# Patient Record
Sex: MALE | Race: WHITE | HISPANIC OR LATINO
[De-identification: names, ages, dates, MRNs, and addresses within clinical notes are randomized per-mention and may not be internally consistent; named-entity substitution may affect disease eponyms.]

---

## 2018-03-20 PROBLEM — Z00.00 ENCOUNTER FOR PREVENTIVE HEALTH EXAMINATION: Status: ACTIVE | Noted: 2018-03-20

## 2018-06-06 ENCOUNTER — APPOINTMENT (OUTPATIENT)
Dept: GASTROENTEROLOGY | Facility: CLINIC | Age: 63
End: 2018-06-06

## 2019-01-04 ENCOUNTER — APPOINTMENT (OUTPATIENT)
Dept: INTERNAL MEDICINE | Facility: CLINIC | Age: 64
End: 2019-01-04

## 2019-05-10 ENCOUNTER — APPOINTMENT (OUTPATIENT)
Dept: NEUROLOGY | Facility: CLINIC | Age: 64
End: 2019-05-10
Payer: COMMERCIAL

## 2019-05-10 VITALS
SYSTOLIC BLOOD PRESSURE: 112 MMHG | BODY MASS INDEX: 20.59 KG/M2 | WEIGHT: 139 LBS | DIASTOLIC BLOOD PRESSURE: 50 MMHG | HEIGHT: 69 IN | HEART RATE: 81 BPM

## 2019-05-10 DIAGNOSIS — I10 ESSENTIAL (PRIMARY) HYPERTENSION: ICD-10-CM

## 2019-05-10 DIAGNOSIS — E11.9 TYPE 2 DIABETES MELLITUS W/OUT COMPLICATIONS: ICD-10-CM

## 2019-05-10 DIAGNOSIS — R41.3 OTHER AMNESIA: ICD-10-CM

## 2019-05-10 DIAGNOSIS — E78.00 PURE HYPERCHOLESTEROLEMIA, UNSPECIFIED: ICD-10-CM

## 2019-05-10 DIAGNOSIS — Z78.9 OTHER SPECIFIED HEALTH STATUS: ICD-10-CM

## 2019-05-10 DIAGNOSIS — G47.33 OBSTRUCTIVE SLEEP APNEA (ADULT) (PEDIATRIC): ICD-10-CM

## 2019-05-10 PROCEDURE — 99244 OFF/OP CNSLTJ NEW/EST MOD 40: CPT

## 2019-05-10 NOTE — DATA REVIEWED
[de-identified] : EEG 2/19/18: Frequent theta and delta slowing in the bilateral frontotemporal regions suggestive of moderate frontotemporal dysfunction.  [de-identified] : Home sleep study 3/15/18: severe TICO with an AHI of 37.6. Supine AHI 39.8. Non-supine AHI 21.4.\par Neurotrax 3/19/18: mild to moderate dementia\par Blood tests 2/6/18: vitamin B12 910, Lyme negative, RPR non-reactive

## 2019-05-10 NOTE — DISCUSSION/SUMMARY
[FreeTextEntry1] : Mr. Roque is a 64 year old man with a history of multiple medical problems including cognitive impairment.\par He was found to have severe obstructive sleep apnea on a home sleep study in 2018.\par We discussed the risks of untreated sleep apnea including sleepiness, hypertension, increased risk for heart disease and stroke, worsening seizure frequency and cognitive impairment.\par Due to the severe degree of TICO, positive airway pressure is the best treatment choice.\par I will place in order with a durable medical equipment company.  I told him that if he does not hear from the company within 2 weeks he should call our office.  Once he receives her machine she should follow up one month later to discuss any issues.\par I explained that he can use the PAP in the evening while awake and watching television or relaxation for desensitization.\par \par He has expressed a desire [email or web address]. I gave him some names of primary care physicians in the [place] area.\par \par I will follow up with him in 2-3 months, sooner if needed. \par

## 2019-05-10 NOTE — HISTORY OF PRESENT ILLNESS
[FreeTextEntry1] : Mr. Roque is a 64 year old man who I previously saw In April 2018 at my previous practice.\par At that time he reported sleeping from about 2 AM unti l8-9 AM and feeling tired upon awakening. He does not take naps during the day.\par he was drinking about three cups of coffee per day.\par His wife reported that eh snores. He does not wake up feeling like he is choking or gasping for air. He usually sleeps on his side. \par He denied any symptoms of Restless Legs Syndrome or any history of kicking his legs during sleep.\par His Cygnet Sleepiness Scale Score was 4/24 at that time. \par At the time of the visit I recommended treatment with PAP and suggested that an auto PAP machine be ordered. He asked me to hold off at the time as he wanted to see a sleep specialist within the [facility] as he believed it would be more affordable with his insurance. He was given a referral with a copy of his sleep study. \par \par He continues to have snoring and feels tired the next day. \par He states that he sleeps between 6-7 hours per night. \par His wife sleeps in a different room.\par He has not yet gotten a CPAP machine.\par He is willing to give APAP a try at this time. \par He has started dialysis since I last saw him.

## 2019-05-10 NOTE — PHYSICAL EXAM
[FreeTextEntry1] : Examination:\par Constitutional: normal, no apparent distress\par Eyes: normal conjunctiva b/l, no ptosis, visual fields full\par Respiratory: no respiratory distress, normal effort, normal auscultation\par Cardiovascular: normal rate, rhythm, no murmurs\par Neck: supple, no masses\par Vascular: carotids normal\par Skin: normal color, no rashes\par Psych: normal mood, affect\par \par Neurological:\par Memory: short term memory impairment\par Language intact/no aphasia\par Cranial Nerves: II-XII intact, Pupils equally round and reactive to light, ocular muscles/movements intact, no ptosis, no facial weakness, tongue protrudes normally in the midline, \par Motor: normal tone, no pronator drift, full strength in all four extremities in the proximal and distal muscle groups\par Coordination: mild, high frequency tremor in bilateral hands,  Fine motor movements intact, rapid alternating movements intact, finger to nose intact bilaterally\par Sensory: intact to light touch, vibration, joint position sense, negative Romberg examination\par DTRs: symmetric, 2+ in b/l triceps, 2+ in b/l biceps, 2+ in b/l brachioradialis, 1+ in bilateral patellars, trace in bilateral Achilles, Babinskis negative bilaterally\par Gait: narrow based, steady\par \par

## 2019-05-10 NOTE — REVIEW OF SYSTEMS
[Feeling Tired] : feeling tired [Incontinence] : incontinence [Negative] : Musculoskeletal [FreeTextEntry8] : sexual dysfunction [FreeTextEntry4] : snoring

## 2019-07-15 ENCOUNTER — APPOINTMENT (OUTPATIENT)
Dept: CARDIOLOGY | Facility: CLINIC | Age: 64
End: 2019-07-15

## 2019-07-15 VITALS
DIASTOLIC BLOOD PRESSURE: 62 MMHG | SYSTOLIC BLOOD PRESSURE: 101 MMHG | BODY MASS INDEX: 20.23 KG/M2 | HEART RATE: 72 BPM | OXYGEN SATURATION: 96 % | WEIGHT: 137 LBS

## 2019-07-15 DIAGNOSIS — N18.9 CHRONIC KIDNEY DISEASE, UNSPECIFIED: ICD-10-CM

## 2019-07-15 DIAGNOSIS — I77.0 ARTERIOVENOUS FISTULA, ACQUIRED: ICD-10-CM

## 2019-07-15 RX ORDER — AZATHIOPRINE 50 1/1
50 TABLET ORAL
Refills: 0 | Status: ACTIVE | COMMUNITY

## 2019-07-15 RX ORDER — DILTIAZEM HCL 360 MG
360 CAPSULE, EXT RELEASE 24 HR ORAL
Refills: 0 | Status: ACTIVE | COMMUNITY

## 2019-07-15 RX ORDER — ADHESIVE TAPE 3"X 2.3 YD
50 MCG TAPE, NON-MEDICATED TOPICAL
Refills: 0 | Status: ACTIVE | COMMUNITY

## 2019-07-15 RX ORDER — SEVELAMER HYDROCHLORIDE 800 MG/1
800 TABLET, FILM COATED ORAL
Refills: 0 | Status: ACTIVE | COMMUNITY

## 2019-07-15 RX ORDER — TACROLIMUS 1 MG/1
1 CAPSULE ORAL
Refills: 0 | Status: ACTIVE | COMMUNITY

## 2019-07-15 RX ORDER — IRON/IRON ASP GLY/FA/MV-MIN 38 125-25-1MG
TABLET ORAL
Refills: 0 | Status: ACTIVE | COMMUNITY

## 2019-07-15 RX ORDER — ATORVASTATIN CALCIUM 20 MG/1
20 TABLET, FILM COATED ORAL
Qty: 1 | Refills: 3 | Status: ACTIVE | COMMUNITY

## 2019-07-15 RX ORDER — FOLIC ACID/VIT B COMPLEX AND C 0.8 MG
TABLET ORAL
Refills: 0 | Status: ACTIVE | COMMUNITY

## 2019-07-15 RX ORDER — TORSEMIDE 20 MG/1
20 TABLET ORAL
Refills: 0 | Status: ACTIVE | COMMUNITY

## 2019-07-15 RX ORDER — PSYLLIUM HUSK 0.4 G
CAPSULE ORAL
Refills: 0 | Status: ACTIVE | COMMUNITY

## 2019-08-13 ENCOUNTER — APPOINTMENT (OUTPATIENT)
Dept: NEUROLOGY | Facility: CLINIC | Age: 64
End: 2019-08-13

## 2021-01-19 ENCOUNTER — EMERGENCY (EMERGENCY)
Facility: HOSPITAL | Age: 66
LOS: 1 days | Discharge: HOME | End: 2021-01-19
Attending: EMERGENCY MEDICINE
Payer: SELF-PAY

## 2021-01-19 VITALS
RESPIRATION RATE: 18 BRPM | SYSTOLIC BLOOD PRESSURE: 117 MMHG | TEMPERATURE: 97 F | OXYGEN SATURATION: 100 % | DIASTOLIC BLOOD PRESSURE: 68 MMHG | HEART RATE: 71 BPM

## 2021-01-19 VITALS
TEMPERATURE: 98 F | SYSTOLIC BLOOD PRESSURE: 123 MMHG | RESPIRATION RATE: 20 BRPM | DIASTOLIC BLOOD PRESSURE: 79 MMHG | OXYGEN SATURATION: 95 % | HEART RATE: 62 BPM

## 2021-01-19 DIAGNOSIS — Z94.1 HEART TRANSPLANT STATUS: Chronic | ICD-10-CM

## 2021-01-19 LAB
ALBUMIN SERPL ELPH-MCNC: 4.5 G/DL — SIGNIFICANT CHANGE UP (ref 3.3–5.2)
ALP SERPL-CCNC: 51 U/L — SIGNIFICANT CHANGE UP (ref 40–120)
ALT FLD-CCNC: 13 U/L — SIGNIFICANT CHANGE UP
ANION GAP SERPL CALC-SCNC: 14 MMOL/L — SIGNIFICANT CHANGE UP (ref 5–17)
ANISOCYTOSIS BLD QL: SIGNIFICANT CHANGE UP
APTT BLD: 31.6 SEC — SIGNIFICANT CHANGE UP (ref 27.5–35.5)
AST SERPL-CCNC: 19 U/L — SIGNIFICANT CHANGE UP
BASOPHILS # BLD AUTO: 0.05 K/UL — SIGNIFICANT CHANGE UP (ref 0–0.2)
BASOPHILS NFR BLD AUTO: 0.9 % — SIGNIFICANT CHANGE UP (ref 0–2)
BILIRUB SERPL-MCNC: 0.5 MG/DL — SIGNIFICANT CHANGE UP (ref 0.4–2)
BUN SERPL-MCNC: 43 MG/DL — HIGH (ref 8–20)
CALCIUM SERPL-MCNC: 9.9 MG/DL — SIGNIFICANT CHANGE UP (ref 8.6–10.2)
CHLORIDE SERPL-SCNC: 89 MMOL/L — LOW (ref 98–107)
CO2 SERPL-SCNC: 29 MMOL/L — SIGNIFICANT CHANGE UP (ref 22–29)
CREAT SERPL-MCNC: 4.54 MG/DL — HIGH (ref 0.5–1.3)
EOSINOPHIL # BLD AUTO: 0 K/UL — SIGNIFICANT CHANGE UP (ref 0–0.5)
EOSINOPHIL NFR BLD AUTO: 0 % — SIGNIFICANT CHANGE UP (ref 0–6)
GIANT PLATELETS BLD QL SMEAR: PRESENT — SIGNIFICANT CHANGE UP
GLUCOSE SERPL-MCNC: 138 MG/DL — HIGH (ref 70–99)
HCT VFR BLD CALC: 33.7 % — LOW (ref 39–50)
HGB BLD-MCNC: 11.5 G/DL — LOW (ref 13–17)
INR BLD: 1.05 RATIO — SIGNIFICANT CHANGE UP (ref 0.88–1.16)
LYMPHOCYTES # BLD AUTO: 0.39 K/UL — LOW (ref 1–3.3)
LYMPHOCYTES # BLD AUTO: 6.9 % — LOW (ref 13–44)
MACROCYTES BLD QL: SIGNIFICANT CHANGE UP
MAGNESIUM SERPL-MCNC: 2.2 MG/DL — SIGNIFICANT CHANGE UP (ref 1.8–2.6)
MANUAL SMEAR VERIFICATION: SIGNIFICANT CHANGE UP
MCHC RBC-ENTMCNC: 34.1 GM/DL — SIGNIFICANT CHANGE UP (ref 32–36)
MCHC RBC-ENTMCNC: 36.2 PG — HIGH (ref 27–34)
MCV RBC AUTO: 106 FL — HIGH (ref 80–100)
MONOCYTES # BLD AUTO: 0.49 K/UL — SIGNIFICANT CHANGE UP (ref 0–0.9)
MONOCYTES NFR BLD AUTO: 8.7 % — SIGNIFICANT CHANGE UP (ref 2–14)
NEUTROPHILS # BLD AUTO: 4.68 K/UL — SIGNIFICANT CHANGE UP (ref 1.8–7.4)
NEUTROPHILS NFR BLD AUTO: 83.5 % — HIGH (ref 43–77)
PLAT MORPH BLD: NORMAL — SIGNIFICANT CHANGE UP
PLATELET # BLD AUTO: 205 K/UL — SIGNIFICANT CHANGE UP (ref 150–400)
POLYCHROMASIA BLD QL SMEAR: SLIGHT — SIGNIFICANT CHANGE UP
POTASSIUM SERPL-MCNC: 4 MMOL/L — SIGNIFICANT CHANGE UP (ref 3.5–5.3)
POTASSIUM SERPL-SCNC: 4 MMOL/L — SIGNIFICANT CHANGE UP (ref 3.5–5.3)
PROT SERPL-MCNC: 7.8 G/DL — SIGNIFICANT CHANGE UP (ref 6.6–8.7)
PROTHROM AB SERPL-ACNC: 12.1 SEC — SIGNIFICANT CHANGE UP (ref 10.6–13.6)
RBC # BLD: 3.18 M/UL — LOW (ref 4.2–5.8)
RBC # FLD: 12.8 % — SIGNIFICANT CHANGE UP (ref 10.3–14.5)
RBC BLD AUTO: ABNORMAL
SODIUM SERPL-SCNC: 132 MMOL/L — LOW (ref 135–145)
TROPONIN T SERPL-MCNC: 0.02 NG/ML — SIGNIFICANT CHANGE UP (ref 0–0.06)
WBC # BLD: 5.61 K/UL — SIGNIFICANT CHANGE UP (ref 3.8–10.5)
WBC # FLD AUTO: 5.61 K/UL — SIGNIFICANT CHANGE UP (ref 3.8–10.5)

## 2021-01-19 PROCEDURE — 70450 CT HEAD/BRAIN W/O DYE: CPT | Mod: 26

## 2021-01-19 PROCEDURE — 99285 EMERGENCY DEPT VISIT HI MDM: CPT

## 2021-01-19 PROCEDURE — 93010 ELECTROCARDIOGRAM REPORT: CPT

## 2021-01-19 RX ORDER — ACETAMINOPHEN 500 MG
975 TABLET ORAL ONCE
Refills: 0 | Status: COMPLETED | OUTPATIENT
Start: 2021-01-19 | End: 2021-01-19

## 2021-01-19 RX ORDER — SODIUM CHLORIDE 9 MG/ML
250 INJECTION INTRAMUSCULAR; INTRAVENOUS; SUBCUTANEOUS ONCE
Refills: 0 | Status: COMPLETED | OUTPATIENT
Start: 2021-01-19 | End: 2021-01-19

## 2021-01-19 RX ADMIN — SODIUM CHLORIDE 250 MILLILITER(S): 9 INJECTION INTRAMUSCULAR; INTRAVENOUS; SUBCUTANEOUS at 15:09

## 2021-01-19 RX ADMIN — Medication 975 MILLIGRAM(S): at 15:32

## 2021-01-19 NOTE — ED PROVIDER NOTE - CLINICAL SUMMARY MEDICAL DECISION MAKING FREE TEXT BOX
Episode is consistent with orthostatic syncope after dialysis. He states he now feels much better, he is at baseline, neuro intact. Stable for dc and states he will f/u with his cardio

## 2021-01-19 NOTE — ED ADULT TRIAGE NOTE - CHIEF COMPLAINT QUOTE
syncopal episode occurred around 1315 states fell and hit head. denies blood thinners.  pt finished dialysis treatment and had syncopal episode. hx heart transplant not taking antirejection medication. pallor in appearance, generalized malaise. complaint of dizziness, md ritetr called to bedside for evaluation

## 2021-01-19 NOTE — CONSULT NOTE ADULT - SUBJECTIVE AND OBJECTIVE BOX
Patient is a 65y old  Male who presents with a chief complaint of         · Chief Complaint: The patient is a 65y Male complaining of syncope.  · HPI Objective Statement: 65 year old male with PMH ESRD on HD, heart transplant presents with syncope. Pt finished his HD today, he stood up form his chair quickly, felt lightheaded, and fell to the floor. he states he thinks he passed out prior to hitting his head. He is c/o diffuse throbbing headache. He denies chest pain, palpitations, SOB, fever, chills, abd pain, numbness, tingling, weakness.    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 , on [employer]graf  HTN     No pertinent past medical history    No significant past surgical history    AVF   Heart Tx         FAMILY HISTORY:      Social History:  No Smok or ETOH   MEDICATIONS  (STANDING):    MEDICATIONS  (PRN):      Allergies    No Known Allergies    Intolerances      Vital Signs Last 24 Hrs  T(C): 36.1 (19 Jan 2021 19:30), Max: 36.7 (19 Jan 2021 13:47)  T(F): 97 (19 Jan 2021 19:30), Max: 98 (19 Jan 2021 13:47)  HR: 71 (19 Jan 2021 19:30) (62 - 71)  BP: 117/68 (19 Jan 2021 19:30) (117/68 - 123/79)  BP(mean): --  RR: 18 (19 Jan 2021 19:30) (18 - 20)  SpO2: 100% (19 Jan 2021 19:30) (95% - 100%)  Daily     Daily   I&O's Detail    I&O's Summary      PHYSICAL EXAM:    GENERAL: NAD, well-groomed, well-developed  HEAD:  Atraumatic, Normocephalic  EYES: EOMI, PERRLA, conjunctiva and sclera clear  ENMT: No tonsillar erythema, exudates, or enlargement; Moist mucous membranes, Good dentition, No lesions  NECK: Supple, No JVD, no tenderness , FROM   NERVOUS SYSTEM:  Alert & Oriented X3, Good concentration; Motor Strength 5/5 B/L upper and lower extremities; DTRs 2+ intact and symmetric  CHEST/LUNG: Clear to percussion bilaterally; No rales, rhonchi, wheezing, or rubs  HEART: Regular rate and rhythm; No murmurs, rubs, or gallops  ABDOMEN: Soft, Nontender, Nondistended; Bowel sounds present  EXTREMITIES:  2+ Peripheral Pulses, No edema , LUE AVF with good thrill       LABS:                        11.5   5.61  )-----------( 205      ( 19 Jan 2021 14:53 )             33.7     01-19    132<L>  |  89<L>  |  43.0<H>  ----------------------------<  138<H>  4.0   |  29.0  |  4.54<H>    Ca    9.9      19 Jan 2021 14:53  Mg     2.2     01-19    TPro  7.8  /  Alb  4.5  /  TBili  0.5  /  DBili  x   /  AST  19  /  ALT  13  /  AlkPhos  51  01-19    PT/INR - ( 19 Jan 2021 14:53 )   PT: 12.1 sec;   INR: 1.05 ratio         PTT - ( 19 Jan 2021 14:53 )  PTT:31.6 sec    Magnesium, Serum: 2.2 mg/dL (01-19 @ 14:53)    < from: CT Head No Cont (01.19.21 @ 14:06) >     EXAM:  CT BRAIN                          PROCEDURE DATE:  01/19/2021          INTERPRETATION:  INDICATION:  Status post trauma with head injury.   Headache.  TECHNIQUE:  A non contrast 2.5mm axial CT study of the brain was performed from skull base to vertex. Coronal and sagittal reformations were generated from the axial data.  COMPARISON EXAMINATION:  no prior.    FINDINGS:    HEMISPHERES:There is mild volume loss and involutional change. Also there are scattered areas of white matter hypodensity suggesting early/mild small vessel ischemic change. No acute territorial infarct or hemorrhagic lesion noted.  VENTRICLES:  Midline and normal in size.  POSTERIOR FOSSA:  The brain stem and cerebellum are unremarkable.  No CP angle lesion noted.  EXTRACEREBRAL SPACES:  No subdural or epidural collections are noted.  SKULL BASE AND CALVARIUM:  Appears intact.  No fracture or destructive lesion is identified.  SINUSES AND MASTOIDS:  Clear.  MISCELLANEOUS:  No orbital or suprasellar abnormalitynoted.    IMPRESSION:    1)  mild volume loss and involutional change, with no acute abnormality suggested..  2)  no intracerebral hemorrhage, contusion, or extracerebral collections are identified.                ALL KAUFMAN MD; Attending Radiologist  This document has been electronically signed. Jan 19 2021  2:13PM    < end of copied text >        RADIOLOGY & ADDITIONAL TESTS:

## 2021-01-19 NOTE — ED ADULT NURSE NOTE - CHIEF COMPLAINT QUOTE
syncopal episode occurred around 1315 states fell and hit head. denies blood thinners.  pt finished dialysis treatment and had syncopal episode. hx heart transplant not taking antirejection medication. pallor in appearance, generalized malaise. complaint of dizziness, md ritter called to bedside for evaluation

## 2021-01-19 NOTE — ED PROVIDER NOTE - PATIENT PORTAL LINK FT
You can access the FollowMyHealth Patient Portal offered by [facility] by registering at the following website: [email or web address]. By joining [employer]’s FollowMyHealth portal, you will also be able to view your health information using other applications (apps) compatible with our system.

## 2021-01-19 NOTE — ED PROVIDER NOTE - OBJECTIVE STATEMENT
65 year old male with PMH ESRD on HD, heart transplant presents with syncope. Pt finished his HD today, he stood up form his chair quickly, felt lightheaded, and fell to the floor. he states he thinks he passed out prior to hitting his head. He is c/o diffuse throbbing headache. He denies chest pain, palpitations, SOB, fever, chills, abd pain, numbness, tingling, weakness.

## 2021-01-19 NOTE — CONSULT NOTE ADULT - ASSESSMENT
ESRD - Episode of Syncope post HD   Likely related to fluid removal orthostasis   CT head no acute changes   Hgb stable   BP stable in ER   No focal abnormalities on exam except  bump to occiput

## 2021-01-19 NOTE — ED ADULT NURSE REASSESSMENT NOTE - NS ED NURSE REASSESS COMMENT FT1
Assumed care for patient at 1515. patient is resting in stretcher on tele box, IVF infusing through peripheral IV.  C/o headache, notified Dr. Wells. 975mg tylenol to be given as ordered.  Ekg obtained.  Plan of care explained. Patient expressed understanding. Assumed care for patient at 1515.  Report received from cc RN Helen Maharaj, charting as noted.  patient is resting in stretcher on tele box, IVF infusing through peripheral IV.  C/o headache, notified Dr. Wells. 975mg tylenol to be given as ordered.  Ekg obtained.  Plan of care explained. Patient expressed understanding.

## 2022-02-04 ENCOUNTER — EMERGENCY (EMERGENCY)
Facility: HOSPITAL | Age: 67
LOS: 1 days | Discharge: HOME | End: 2022-02-04
Attending: STUDENT IN AN ORGANIZED HEALTH CARE EDUCATION/TRAINING PROGRAM
Payer: MEDICARE

## 2022-02-04 VITALS
OXYGEN SATURATION: 98 % | HEART RATE: 77 BPM | DIASTOLIC BLOOD PRESSURE: 75 MMHG | HEIGHT: 69 IN | TEMPERATURE: 98 F | RESPIRATION RATE: 16 BRPM | SYSTOLIC BLOOD PRESSURE: 128 MMHG

## 2022-02-04 DIAGNOSIS — Z94.1 HEART TRANSPLANT STATUS: Chronic | ICD-10-CM

## 2022-02-04 LAB
ALBUMIN SERPL ELPH-MCNC: 4.5 G/DL — SIGNIFICANT CHANGE UP (ref 3.3–5.2)
ALP SERPL-CCNC: 79 U/L — SIGNIFICANT CHANGE UP (ref 40–120)
ALT FLD-CCNC: 9 U/L — SIGNIFICANT CHANGE UP
ANION GAP SERPL CALC-SCNC: 15 MMOL/L — SIGNIFICANT CHANGE UP (ref 5–17)
AST SERPL-CCNC: 17 U/L — SIGNIFICANT CHANGE UP
BASOPHILS # BLD AUTO: 0.04 K/UL — SIGNIFICANT CHANGE UP (ref 0–0.2)
BASOPHILS NFR BLD AUTO: 0.9 % — SIGNIFICANT CHANGE UP (ref 0–2)
BILIRUB SERPL-MCNC: 0.5 MG/DL — SIGNIFICANT CHANGE UP (ref 0.4–2)
BUN SERPL-MCNC: 46.1 MG/DL — HIGH (ref 8–20)
CALCIUM SERPL-MCNC: 9.5 MG/DL — SIGNIFICANT CHANGE UP (ref 8.6–10.2)
CHLORIDE SERPL-SCNC: 88 MMOL/L — LOW (ref 98–107)
CO2 SERPL-SCNC: 29 MMOL/L — SIGNIFICANT CHANGE UP (ref 22–29)
CREAT SERPL-MCNC: 6.04 MG/DL — HIGH (ref 0.5–1.3)
EOSINOPHIL # BLD AUTO: 0.01 K/UL — SIGNIFICANT CHANGE UP (ref 0–0.5)
EOSINOPHIL NFR BLD AUTO: 0.2 % — SIGNIFICANT CHANGE UP (ref 0–6)
GLUCOSE SERPL-MCNC: 160 MG/DL — HIGH (ref 70–99)
HCT VFR BLD CALC: 36 % — LOW (ref 39–50)
HGB BLD-MCNC: 11.8 G/DL — LOW (ref 13–17)
IMM GRANULOCYTES NFR BLD AUTO: 0 % — SIGNIFICANT CHANGE UP (ref 0–1.5)
LYMPHOCYTES # BLD AUTO: 0.6 K/UL — LOW (ref 1–3.3)
LYMPHOCYTES # BLD AUTO: 13.9 % — SIGNIFICANT CHANGE UP (ref 13–44)
MAGNESIUM SERPL-MCNC: 2.3 MG/DL — SIGNIFICANT CHANGE UP (ref 1.8–2.6)
MCHC RBC-ENTMCNC: 32.8 GM/DL — SIGNIFICANT CHANGE UP (ref 32–36)
MCHC RBC-ENTMCNC: 33.8 PG — SIGNIFICANT CHANGE UP (ref 27–34)
MCV RBC AUTO: 103.2 FL — HIGH (ref 80–100)
MONOCYTES # BLD AUTO: 0.47 K/UL — SIGNIFICANT CHANGE UP (ref 0–0.9)
MONOCYTES NFR BLD AUTO: 10.9 % — SIGNIFICANT CHANGE UP (ref 2–14)
NEUTROPHILS # BLD AUTO: 3.19 K/UL — SIGNIFICANT CHANGE UP (ref 1.8–7.4)
NEUTROPHILS NFR BLD AUTO: 74.1 % — SIGNIFICANT CHANGE UP (ref 43–77)
PHOSPHATE SERPL-MCNC: 3.7 MG/DL — SIGNIFICANT CHANGE UP (ref 2.4–4.7)
PLATELET # BLD AUTO: 125 K/UL — LOW (ref 150–400)
POTASSIUM SERPL-MCNC: 4.3 MMOL/L — SIGNIFICANT CHANGE UP (ref 3.5–5.3)
POTASSIUM SERPL-SCNC: 4.3 MMOL/L — SIGNIFICANT CHANGE UP (ref 3.5–5.3)
PROT SERPL-MCNC: 7.3 G/DL — SIGNIFICANT CHANGE UP (ref 6.6–8.7)
RBC # BLD: 3.49 M/UL — LOW (ref 4.2–5.8)
RBC # FLD: 13.8 % — SIGNIFICANT CHANGE UP (ref 10.3–14.5)
SODIUM SERPL-SCNC: 132 MMOL/L — LOW (ref 135–145)
TROPONIN T SERPL-MCNC: 0.04 NG/ML — SIGNIFICANT CHANGE UP (ref 0–0.06)
WBC # BLD: 4.31 K/UL — SIGNIFICANT CHANGE UP (ref 3.8–10.5)
WBC # FLD AUTO: 4.31 K/UL — SIGNIFICANT CHANGE UP (ref 3.8–10.5)

## 2022-02-04 PROCEDURE — 80053 COMPREHEN METABOLIC PANEL: CPT

## 2022-02-04 PROCEDURE — 36415 COLL VENOUS BLD VENIPUNCTURE: CPT

## 2022-02-04 PROCEDURE — 83735 ASSAY OF MAGNESIUM: CPT

## 2022-02-04 PROCEDURE — G1004: CPT

## 2022-02-04 PROCEDURE — 84484 ASSAY OF TROPONIN QUANT: CPT

## 2022-02-04 PROCEDURE — 70450 CT HEAD/BRAIN W/O DYE: CPT | Mod: 26,ME

## 2022-02-04 PROCEDURE — 93010 ELECTROCARDIOGRAM REPORT: CPT

## 2022-02-04 PROCEDURE — 93005 ELECTROCARDIOGRAM TRACING: CPT

## 2022-02-04 PROCEDURE — 70450 CT HEAD/BRAIN W/O DYE: CPT | Mod: ME

## 2022-02-04 PROCEDURE — 71045 X-RAY EXAM CHEST 1 VIEW: CPT

## 2022-02-04 PROCEDURE — 99285 EMERGENCY DEPT VISIT HI MDM: CPT | Mod: 25

## 2022-02-04 PROCEDURE — 85025 COMPLETE CBC W/AUTO DIFF WBC: CPT

## 2022-02-04 PROCEDURE — 99285 EMERGENCY DEPT VISIT HI MDM: CPT

## 2022-02-04 PROCEDURE — 71045 X-RAY EXAM CHEST 1 VIEW: CPT | Mod: 26

## 2022-02-04 PROCEDURE — 84100 ASSAY OF PHOSPHORUS: CPT

## 2022-02-04 RX ORDER — ACETAMINOPHEN 500 MG
975 TABLET ORAL ONCE
Refills: 0 | Status: COMPLETED | OUTPATIENT
Start: 2022-02-04 | End: 2022-02-04

## 2022-02-04 RX ADMIN — Medication 975 MILLIGRAM(S): at 15:27

## 2022-02-04 NOTE — CONSULT NOTE ADULT - SUBJECTIVE AND OBJECTIVE BOX
Patient is a 67y old  Male who presents with a chief complaint of  AMS.    HPI:   Pt sent in after  dialysis today. Pt  had AMS without fever or cough. Pt states he has been having episodes of confusion at home at times. Pt can"t be more specific. Hx of DM 2, Hypertension, CHF. Pt is a heart transplant pt.    PAST MEDICAL & SURGICAL HISTORY:  Heart disease  heart transplant 2007    H/O heart transplant        FAMILY HISTORY:  [place]    Social History: Prior smoker    MEDICATIONS  (STANDING):    MEDICATIONS  (PRN):   Meds reviewed    Allergies    No Known Allergies        REVIEW OF SYSTEMS:    As above    ALLERY AND IMMUNOLOGIC: No hives or eczema      Vital Signs Last 24 Hrs  T(C): 36.4 (04 Feb 2022 11:30), Max: 36.4 (04 Feb 2022 11:30)  T(F): 97.5 (04 Feb 2022 11:30), Max: 97.5 (04 Feb 2022 11:30)  HR: 77 (04 Feb 2022 11:30) (77 - 77)  BP: 128/75 (04 Feb 2022 11:30) (128/75 - 128/75)  BP(mean): --  RR: 16 (04 Feb 2022 11:30) (16 - 16)  SpO2: 98% (04 Feb 2022 11:30) (98% - 98%)  Daily Height in cm: 175.26 (04 Feb 2022 11:30)    Daily     PHYSICAL EXAM:    GENERAL: appears chronically ill, oriented.  HEAD:  Atraumatic, Normocephalic  EYES: EOMI, PERRLA, conjunctiva and sclera clear  ENMT: No tonsillar erythema, exudates, or enlargement; Moist mucous membranes, Good dentition, No lesions  NECK: Supple, neck  veins flat  NERVOUS SYSTEM:  Alert & Oriented X3, slow mentation with decreased memory; Motor Strength wnl upper and lower extremities;  CHEST/LUNG: Clear to percussion bilaterally; No rales, rhonchi, wheezing, or rubs  HEART: Regular rate and rhythm; Sternal scar, no rub  ABDOMEN: Soft, Nontender, Nondistended; Bowel sounds present  EXTREMITIES:  Ankle and lower leg  edema  LYMPH: No lymphadenopathy noted  SKIN: No rashes or lesions, pale  : Neg    LABS:                        11.8   4.31  )-----------( 125      ( 04 Feb 2022 13:38 )             36.0     02-04    132<L>  |  88<L>  |  46.1<H>  ----------------------------<  160<H>  4.3   |  29.0  |  6.04<H>    Ca    9.5      04 Feb 2022 13:38  Phos  3.7     02-04  Mg     2.3     02-04    TPro  7.3  /  Alb  4.5  /  TBili  0.5  /  DBili  x   /  AST  17  /  ALT  9   /  AlkPhos  79  02-04        Magnesium, Serum: 2.3 mg/dL (02-04 @ 13:38)  Phosphorus Level, Serum: 3.7 mg/dL (02-04 @ 13:38)          RADIOLOGY & ADDITIONAL TESTS:

## 2022-02-04 NOTE — ED ADULT NURSE REASSESSMENT NOTE - NS ED NURSE REASSESS COMMENT FT1
Pt AOx4, [place] speaking. Pt in no acute distress, resp even and unlabored, lung sounds clear, skin warm and dry to touch, abdomen soft and non-tender. Pt denies having pain at this time. Labs drawn and sent to lab. Pt sitting up in bed, will continue to monitor.

## 2022-02-04 NOTE — ED PROVIDER NOTE - NSFOLLOWUPINSTRUCTIONS_ED_ALL_ED_FT
1. Follow up with your doctor within 2-3 days.   2. Continue your dialysis as scheduled.  3. Return to the ED for any new or worsening symptoms.     Dizziness    Dizziness can manifest as a feeling of unsteadiness or light-headedness. You may feel like you are about to faint. This condition can be caused by a number of things, including medicines, dehydration, or illness. Drink enough fluid to keep your urine clear or pale yellow. Do not drink alcohol and limit your caffeine intake. Avoid quick or sudden movements.  Rise slowly from chairs and steady yourself until you feel okay. In the morning, first sit up on the side of the bed.    SEEK IMMEDIATE MEDICAL CARE IF YOU HAVE ANY OF THE FOLLOWING SYMPTOMS: vomiting, changes in your vision or speech, weakness in your arms or legs, trouble speaking or swallowing, chest pain, abdominal pain, shortness of breath, sweating, bleeding, headache, neck pain, or fever.

## 2022-02-04 NOTE — ED ADULT TRIAGE NOTE - CHIEF COMPLAINT QUOTE
Pt reports was receiving dialysis when he suddenly experienced dizziness, nausea and 8/10 frontal lobe HA. EMS reports staff at dialysis facility state pt's port "infiltrated" when he was 30 minutes from completion. Pt was given dose of clonidine PTA for hypertension. MD called for STAT eval.

## 2022-02-04 NOTE — CONSULT NOTE ADULT - ASSESSMENT
A) Heart transplant pt with CRF 5 and mentation issue noted by pt and  staff.    P) Labs, Head CAT no dye.

## 2022-02-04 NOTE — ED PROVIDER NOTE - CLINICAL SUMMARY MEDICAL DECISION MAKING FREE TEXT BOX
68yo M w/[facility] heart transplant 2007, ESRD on HD T/R/S presents for dizziness, HA. EKG, labs, CT head pending.

## 2022-02-04 NOTE — ED PROVIDER NOTE - NS ED ROS FT
Constitutional: no fever, no sweats, and no chills.  CV: no chest pain, no edema.  Resp: no cough, no dyspnea  GI: no abdominal pain, no nausea and no vomiting.  MSK: no msk pain, no weakness  Skin: no lesions, and no rashes.  Neuro: no LOC, +headache, no sensory deficits, and +dizziness  ROS otherwise negative except as noted in HPI.

## 2022-02-04 NOTE — ED PROVIDER NOTE - PATIENT PORTAL LINK FT
You can access the FollowMyHealth Patient Portal offered by [facility] by registering at the following website: [email or web address]. By joining [employer]’s FollowMyHealth portal, you will also be able to view your health information using other applications (apps) compatible with our system.

## 2022-02-04 NOTE — ED PROVIDER NOTE - PROGRESS NOTE DETAILS
Resident Olivia Shaffer: spoke to son, All Rqoue, English-speaking. Discussed plan for labs, CT. Answered all questions. Resident Olivia Shaffer: spoke to reva Carrizales again, discussed lab results. CT head pending. Resident Olivia Shaffer: nephrology has seen the pt, recs labs and CT head. Labs unremarkable other than cre 6, pt already on dialysis. CT pending. Resident Olivia Shaffer: spoke to heart transplant team, they report pt had stress test and echo, CT head all yesterday at the office, and everything was normal. I explained the pt's complaints and current work-up to them, they have no additional recommendations. Resident Olivia Shaffer: spoke to radiology, they report CT head is negative.

## 2022-02-04 NOTE — ED PROVIDER NOTE - ATTENDING CONTRIBUTION TO CARE
I performed a face to face history and physical exam of the patient and discussed their management with the student/resident/ACP. I reviewed the student/resident/ACP's note and agree with the documented findings and plan of care.    Pt was at dialysis today when he had onset of dizziness and frontal headache. They stopped dialysis 30 min early and sent him to the ED. Pt only co frontal headache currently. Dr. Shaffer spoke with his heart transplant coordinator who stated patient had a stress and echo yesterday and a CT head.  CT head was because they have noticed some recent cognitive decline. no cp. no sob.    physical - rrr ctab. abd - soft, nt. no edema. no rash. neuro - [id number] 5/5 x4. sensation intact x4. CN II-XII intact.    plan - labs and imaging reviewed.  Pt feeling better.  likely had too much fluid removed during dialysis. Pt reassured and instructed to f/up with his doctors and to return for any new/concerning symptoms.

## 2022-02-04 NOTE — ED PROVIDER NOTE - OBJECTIVE STATEMENT
66yo M w/pmh heart transplant 2007, ESRD on HD T/R/S presents for dizziness, HA. Per EMS, pt was at dialysis when symptom onset, dialysis stopped 30min early. Per pt, dizzy since this morning, does not know why he was scheduled for dialysis today (a Friday), does not know when he last got dialysis. Per chart review, nephrologist is Dr. Chapa.

## 2024-06-10 PROBLEM — Z78.9 OTHER SPECIFIED HEALTH STATUS: Chronic | Status: ACTIVE | Noted: 2021-01-19

## 2024-09-06 ENCOUNTER — APPOINTMENT (OUTPATIENT)
Dept: NEPHROLOGY | Facility: CLINIC | Age: 69
End: 2024-09-06

## 2025-05-30 NOTE — ED ADULT TRIAGE NOTE - RESPIRATORY RATE (BREATHS/MIN)
Health Decision Maker has been checked with the patient   Primary Decision Maker: Ranjit Aparicio - Spouse - [phone]     AI form NOT signed    Chief Complaint   Patient presents with    Establish Care     NTP       \"Have you been to the ER, urgent care clinic since your last visit?  Hospitalized since your last visit?\"    NO    “Have you seen or consulted any other health care providers outside of [facility] since your last visit?”    NO      Vitals:    05/30/25 0825   Temp: 97.8 °F (36.6 °C)   TempSrc: Oral   Weight: 49.4 kg (108 lb 12.8 oz)   Height: 1.6 m (5' 3\")      Depression: Not at risk (5/30/2025)    PHQ-2     PHQ-2 Score: 0              Click Here for Release of Records Request    Chart reviewed: immunizations are documented.   Immunization History   Administered Date(s) Administered    COVID-19, MODERNA BLUE border, Primary or Immunocompromised, (age 12y+), IM, 100 mcg/0.5mL 11/16/2021    COVID-19, MODERNA Bivalent, (age 12y+), IM, 50 mcg/0.5 mL 05/02/2022, 10/14/2022    COVID-19, MODERNA, 2024/25, (age 12y+), IM, 50mcg/0.5mL 05/07/2023    COVID-19, PFIZER PURPLE top, DILUTE for use, (age 12 y+), 30mcg/0.3mL 04/14/2021, 05/05/2021    Influenza A ([id number]),all Formulations 03/30/2010    Influenza Trivalent 12/05/2008, 10/06/2014, 10/06/2014, 10/15/2015, 10/15/2015    Influenza Virus Vaccine 10/06/2014, 10/11/2016, 11/20/2018    Influenza, FLUARIX, FLULAVAL, FLUZONE (age 6 mo+) and AFLURIA, (age 3 y+), Quadv PF, 0.5mL 09/30/2015, 09/29/2017, 10/07/2019, 09/16/2020, 09/27/2021    Influenza, FLUCELVAX, (age 6 mo+), MDCK, Quadv PF, 0.5mL 11/28/2018    Influenza, Trivalent PF 09/03/2010, 09/22/2011, 09/25/2012, 09/17/2013    Pneumococcal, PPSV23, PNEUMOVAX 23, (age 2y+), SC/IM, 0.5mL 02/11/2022    RSV, AREXVY, (age 60y+), PF, IM, 0.5mL 10/27/2023    Rabies 05/29/2024, 06/01/2024, 06/05/2024, 06/12/2024    Rabies Immune Globulin 05/29/2024    TD 5LF, TENIVAC, (age 7y+), IM, 0.5mL 05/29/2024  16